# Patient Record
Sex: MALE
[De-identification: names, ages, dates, MRNs, and addresses within clinical notes are randomized per-mention and may not be internally consistent; named-entity substitution may affect disease eponyms.]

---

## 2020-01-27 ENCOUNTER — HOSPITAL ENCOUNTER (EMERGENCY)
Dept: HOSPITAL 56 - MW.ED | Age: 35
Discharge: HOME | End: 2020-01-27
Payer: SELF-PAY

## 2020-01-27 VITALS — HEART RATE: 58 BPM | DIASTOLIC BLOOD PRESSURE: 81 MMHG | SYSTOLIC BLOOD PRESSURE: 133 MMHG

## 2020-01-27 DIAGNOSIS — R25.9: Primary | ICD-10-CM

## 2020-01-27 LAB
BUN SERPL-MCNC: 18 MG/DL (ref 7–18)
CHLORIDE SERPL-SCNC: 102 MMOL/L (ref 98–107)
CO2 SERPL-SCNC: 23.2 MMOL/L (ref 21–32)
GLUCOSE SERPL-MCNC: 113 MG/DL (ref 74–106)
LIPASE SERPL-CCNC: 111 U/L (ref 73–393)
POTASSIUM SERPL-SCNC: 4.1 MMOL/L (ref 3.5–5.1)
SODIUM SERPL-SCNC: 138 MMOL/L (ref 136–148)

## 2020-01-27 PROCEDURE — 83735 ASSAY OF MAGNESIUM: CPT

## 2020-01-27 PROCEDURE — 36415 COLL VENOUS BLD VENIPUNCTURE: CPT

## 2020-01-27 PROCEDURE — 99285 EMERGENCY DEPT VISIT HI MDM: CPT

## 2020-01-27 PROCEDURE — 84484 ASSAY OF TROPONIN QUANT: CPT

## 2020-01-27 PROCEDURE — 96374 THER/PROPH/DIAG INJ IV PUSH: CPT

## 2020-01-27 PROCEDURE — 84146 ASSAY OF PROLACTIN: CPT

## 2020-01-27 PROCEDURE — 80053 COMPREHEN METABOLIC PANEL: CPT

## 2020-01-27 PROCEDURE — 70450 CT HEAD/BRAIN W/O DYE: CPT

## 2020-01-27 PROCEDURE — 96361 HYDRATE IV INFUSION ADD-ON: CPT

## 2020-01-27 PROCEDURE — 71045 X-RAY EXAM CHEST 1 VIEW: CPT

## 2020-01-27 PROCEDURE — 81003 URINALYSIS AUTO W/O SCOPE: CPT

## 2020-01-27 PROCEDURE — 93005 ELECTROCARDIOGRAM TRACING: CPT

## 2020-01-27 PROCEDURE — 85025 COMPLETE CBC W/AUTO DIFF WBC: CPT

## 2020-01-27 PROCEDURE — 83690 ASSAY OF LIPASE: CPT

## 2020-01-27 NOTE — EDM.PDOC
ED HPI GENERAL MEDICAL PROBLEM





- General


Chief Complaint: Neuro Symptoms/Deficits


Stated Complaint: seizure


Time Seen by Provider: 01/27/20 09:59


Source of Information: Reports: Patient, EMS


History Limitations: Reports: No Limitations





- History of Present Illness


INITIAL COMMENTS - FREE TEXT/NARRATIVE: 


HISTORY AND PHYSICAL:





History of present illness:


Patient is a 34-year-old male who presents to the ED today via EMS with concern 

of seizure-like activity that occurred just prior to arrival to the ED. Per EMS 

report, patient was at work and was in a bobcat when the coworkers had noticed 

that the bobcat was shaking.  Coworkers went to go check on him and saw that he 

was having shaking-like activity and seizure-like activity.  Per coworkers this 

lasted 2 to 3 minutes and patient was incontinent of urine on scene.  Upon 

arrival to the ED, patient is alert and states that he does not remember what 

had happened.  Patient states he has never had seizure activity before.





Patient denies fever, chills, chest pain, shortness of breath, or cough. Denies 

headache, neck stiff ness, change in vision, syncope, or near syncope. Denies 

nausea, vomiting, abdominal pain, diarrhea, constipation, or dysuria. Has not 

noted any blood in urine or stool. Patient has been eating and drinking 

appropriately.





Review of systems: 


As per history of present illness and below otherwise all systems reviewed and 

negative.





Past medical history: 


As per history of present illness and as reviewed below otherwise 

noncontributory.





Surgical history: 


As per history of present illness and as reviewed below otherwise 

noncontributory.





Social history: 


See social history for further information





Family history: 


As per history of present illness and as reviewed below otherwise 

noncontributory.





Physical exam:


General: Patient is alert, oriented, and in no acute distress. Patient laying 

comfortably on exam table.


HEENT: Atraumatic, normocephalic, pupils equal and reactive bilaterally, 

negative for conjunctival pallor or scleral icterus, mucous membranes moist, 

TMs normal bilaterally, throat clear, neck supple, nontender, trachea midline. 

No drooling or trismus noted. No meningeal signs. No hot potato voice noted. 

Tongue intact without bite marks.


Lungs: Clear to auscultation, breath sounds equal bilaterally, chest nontender.


Heart: S1S2, regular rate and rhythm without overt murmur


Abdomen: Soft, nondistended, nontender. Negative for masses or 

hepatosplenomegaly. Negative for costovertebral tenderness.


Pelvis: Stable nontender.


Genitourinary: Deferred.


Rectal: Deferred.


Skin: Intact, warm, dry. No lesions or rashes noted.


Extremities: Atraumatic, negative for cords or calf pain. Neurovascular 

unremarkable.


Neuro: Awake, alert, oriented. Cranial nerves II through XII unremarkable. 

Cerebellum unremarkable. Motor and sensory unremarkable throughout. Exam 

nonfocal.





Notes:


Dr. Chance verbally involved in patient care.


Discussed importance for follow-up with both a primary care provider as well as 

neurology.  Patient has been referred to the neurology clinic.


Voices understanding and is agreeable to plan of care. Denies any further 

questions or concerns at this time.





Diagnostics:


Head CT, CBC, CMP, UA, EKG, chest x-ray, prolactin, Trop, lipase, Mg





Therapeutics:


NS, Ativan 2mg





Prescription:


None





Impression: 


Seizure like activity





Plan:


1. No driving or operating heavy machinery until follow up with primary care 

provider or neurology.


2.  Follow-up with both your primary care provider and neurologist as 

discussed.  The number has been provided above for you to call and establish an 

appointment time.


3.  You can alternate ibuprofen and Tylenol as directed for pain and discomfort.


4.  Return to the ED as needed and as discussed.





Definitive disposition and diagnosis as appropriate pending reevaluation and 

review of above.








- Related Data


 Allergies











Allergy/AdvReac Type Severity Reaction Status Date / Time


 


No Known Allergies Allergy   Verified 01/27/20 09:59











Home Meds: 


 Home Meds





. [No Known Home Meds]  09/17/15 [History]











Past Medical History


Other Gastrointestinal History: Constipation, recent Flu Symptoms


Other Genitourinary History: Pt states he has only 1 kidney





- Past Surgical History


Other Female  Surgeries/Procedures: Right Nephrectomy for "Kidney Failure"





Social & Family History





- Family History


Family Medical History: Noncontributory





- Tobacco Use


Smoking Status *Q: Unknown Ever Smoked





- Recreational Drug Use


Recreational Drug Use: No





ED ROS GENERAL





- Review of Systems


Review Of Systems: Comprehensive ROS is negative, except as noted in HPI.





ED EXAM, GENERAL





- Physical Exam


Exam: See Below (see dictation)





Course





- Vital Signs


Last Recorded V/S: 


 Last Vital Signs











Temp  97.5 F   01/27/20 09:59


 


Pulse  100   01/27/20 09:59


 


Resp  16   01/27/20 09:59


 


BP  135/87   01/27/20 09:59


 


Pulse Ox  92 L  01/27/20 09:59














- Orders/Labs/Meds


Orders: 


 Active Orders 24 hr











 Category Date Time Status


 


 EKG Documentation Completion [RC] STAT Care  01/27/20 10:02 Active


 


 UA RFX JOHNIE AND CULT IF INDIC [URIN] Stat Lab  01/27/20 10:01 Ordered











Labs: 


 Laboratory Tests











  01/27/20 01/27/20 01/27/20 Range/Units





  10:10 10:10 10:10 


 


WBC  5.13    (4.0-11.0)  K/uL


 


RBC  4.49 L    (4.50-5.90)  M/uL


 


Hgb  13.7    (13.0-17.0)  g/dL


 


Hct  41.5    (38.0-50.0)  %


 


MCV  92.4    (80.0-98.0)  fL


 


MCH  30.5    (27.0-32.0)  pg


 


MCHC  33.0    (31.0-37.0)  g/dL


 


RDW Std Deviation  40.6    (28.0-62.0)  fl


 


RDW Coeff of Moni  12    (11.0-15.0)  %


 


Plt Count  175    (150-400)  K/uL


 


MPV  11.70    (7.40-12.00)  fL


 


Neut % (Auto)  46.9 L    (48.0-80.0)  %


 


Lymph % (Auto)  38.0    (16.0-40.0)  %


 


Mono % (Auto)  9.4    (0.0-15.0)  %


 


Eos % (Auto)  5.5    (0.0-7.0)  %


 


Baso % (Auto)  0.2    (0.0-1.5)  %


 


Neut # (Auto)  2.4    (1.4-5.7)  K/uL


 


Lymph # (Auto)  2.0    (0.6-2.4)  K/uL


 


Mono # (Auto)  0.5    (0.0-0.8)  K/uL


 


Eos # (Auto)  0.3    (0.0-0.7)  K/uL


 


Baso # (Auto)  0.0    (0.0-0.1)  K/uL


 


Nucleated RBC %  0.0    /100WBC


 


Nucleated RBCs #  0    K/uL


 


Sodium   138   (136-148)  mmol/L


 


Potassium   4.1   (3.5-5.1)  mmol/L


 


Chloride   102   ()  mmol/L


 


Carbon Dioxide   23.2   (21.0-32.0)  mmol/L


 


BUN   18   (7.0-18.0)  mg/dL


 


Creatinine   1.4 H   (0.8-1.3)  mg/dL


 


Est Cr Clr Drug Dosing   81.85   mL/min


 


Estimated GFR (MDRD)   > 60.0   ml/min


 


Glucose   113 H   ()  mg/dL


 


Calcium   8.7   (8.5-10.1)  mg/dL


 


Magnesium    2.0  (1.8-2.4)  mg/dL


 


Total Bilirubin   0.6   (0.2-1.0)  mg/dL


 


AST   21   (15-37)  IU/L


 


ALT   37   (14-63)  IU/L


 


Alkaline Phosphatase   70   ()  U/L


 


Troponin I   < 0.050   (0.000-0.056)  ng/mL


 


Total Protein   7.5   (6.4-8.2)  g/dL


 


Albumin   3.8   (3.4-5.0)  g/dL


 


Globulin   3.7   (2.6-4.0)  g/dL


 


Albumin/Globulin Ratio   1.0   (0.9-1.6)  


 


Lipase   111   ()  U/L


 


Prolactin   22.0   ng/mL











Meds: 


Medications














Discontinued Medications














Generic Name Dose Route Start Last Admin





  Trade Name Freq  PRN Reason Stop Dose Admin


 


Sodium Chloride  1,000 mls @ 999 mls/hr  01/27/20 10:01  01/27/20 10:28





  Normal Saline  IV  01/27/20 11:01  999 mls/hr





  BOLUS ONE   Administration





     





     





     





     


 


Lorazepam  2 mg  01/27/20 10:02  01/27/20 10:29





  Ativan  IVPUSH  01/27/20 10:03  2 mg





  ONETIME ONE   Administration





     





     





     





     














Departure





- Departure


Time of Disposition: 12:15


Disposition: Home, Self-Care 01


Clinical Impression: 


 Seizure-like activity








- Discharge Information


Referrals: 


PCP,None [Primary Care Provider] - 


Forms:  ED Department Discharge


Additional Instructions: 


The following information is given to patients seen in the emergency department 

who are being discharged to home. This information is to outline your options 

for follow-up care. We provide all patients seen in our emergency department 

with a follow-up referral.





The need for follow-up, as well as the timing and circumstances, are variable 

depending upon the specifics of your emergency department visit.





If you don't have a primary care physician on staff, we will provide you with a 

referral. We always advise you to contact your personal physician following an 

emergency department visit to inform them of the circumstance of the visit and 

for follow-up with them and/or the need for any referrals to a consulting 

specialist.





The emergency department will also refer you to a specialist when appropriate. 

This referral assures that you have the opportunity for follow-up care with a 

specialist. All of these measure are taken in an effort to provide you with 

optimal care, which includes your follow-up.





Under all circumstances we always encourage you to contact your private 

physician who remains a resource for coordinating your care. When calling for 

follow-up care, please make the office aware that this follow-up is from your 

recent emergency room visit. If for any reason you are refused follow-up, 

please contact the [facility] Emergency 

Department at [phone] and asked to speak to the emergency department 

charge nurse.





[facility]


Primary Care


[address] [place], [place] [address]


Phone: [phone]


Fax: [phone]





[address] [address]


Phone: [phone]


Fax: [phone]





[facility] - Neurology


[address]


[address] [place] [address], [address] 


[place] [address]


Phone: [phone]


Fax: [phone]








1. No driving or operating heavy machinery until follow up with primary care 

provider or neurology.


2.  Follow-up with both your primary care provider and neurologist as 

discussed.  The number has been provided above for you to call and establish an 

appointment time.


3.  You can alternate ibuprofen and Tylenol as directed for pain and discomfort.


4.  Return to the ED as needed and as discussed.











 











Sepsis Event Note





- Evaluation


Sepsis Screening Result: No Definite Risk





- Focused Exam


Vital Signs: 


 Vital Signs











  Temp Pulse Resp BP Pulse Ox


 


 01/27/20 09:59  97.5 F  100  16  135/87  92 L











Date Exam was Performed: 01/27/20


Time Exam was Performed: 12:13





- My Orders


Last 24 Hours: 


My Active Orders





01/27/20 10:01


UA RFX JOHNIE AND CULT IF INDIC [URIN] Stat 





01/27/20 10:02


EKG Documentation Completion [RC] STAT 














- Assessment/Plan


Last 24 Hours: 


My Active Orders





01/27/20 10:01


UA RFX JOHNIE AND CULT IF INDIC [URIN] Stat 





01/27/20 10:02


EKG Documentation Completion [RC] STAT

## 2020-01-27 NOTE — CR
Chest: AP view of the chest was obtained.

 

Comparison: No prior chest x-ray.

 

Heart size and mediastinum are normal.  Lungs are clear.  Bony 

structures are unremarkable.

 

Impression:

1.  Nothing acute is appreciated on AP view of the chest x-ray.

 

Diagnostic code #1

 

This report was dictated in [place] Standard Time

## 2020-01-27 NOTE — CT
Head CT

 

Technique: Multiple axial sections through the brain were obtained.  

Intravenous contrast was not utilized.

 

Comparison: Prior head CT study of 11/25/12 is available.

 

Findings: Ventricles are dilated.  These findings are stable from 

previous head CT exam.  No worsening is appreciated.  Mild 

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  This also appears 

stable.

 

No abnormal parenchymal densities are seen.  No evidence of 

intracranial hemorrhage.  No midline shift or mass effect is seen.

 

Bone window settings were reviewed which shows no acute calvarial 

abnormality.  Mastoid sinuses show nothing acute.  Visualized 

paranasal sinuses also show nothing acute.

 

Impression:

1.  Stable findings as described above.

2.  Nothing acute is appreciated on noncontrast head CT exam.

 

Diagnostic code #2

 

This report was dictated in [place] Standard Time

## 2020-09-28 NOTE — EDM.PDOC
ED HPI GENERAL MEDICAL PROBLEM





- General


Chief Complaint: Neuro Symptoms/Deficits


Stated Complaint: seizure


Time Seen by Provider: 09/28/20 12:06


Source of Information: Reports: Patient


History Limitations: Reports: No Limitations





- History of Present Illness


INITIAL COMMENTS - FREE TEXT/NARRATIVE: 





35-year-old male with history of seizure disorder presents with seizure episode 

today at work.  He is supposedly compliant with his antiepileptics.  Patient 

denies fever, chills, headache, chest pain, shortness of breath, abdominal pain,

focal numbness or weakness.





ROS: A 10-point review of systems, other than pertinent positives and negatives 

as stated per HPI, is otherwise negative





Past medical history: No additional pertinent history


Past Surgical history: No additional pertinent history


Social history: No additional pertinent history


Family history: No additional pertinent history


________________________________________________________________________________





PHYSICAL EXAM





General: AOx4, GCS = 15, No distress


HEENT: dry mucous membrane


Neck: supple, no meningismus, no Kernig or Brudzinski


Cardiac: S1S2 RRR


Respiratory: CTAB, no crackles or rales, no wheezing


Abdomen: Soft, nontender, no rebound or guarding, nondistended, no pulsatile 

mass.


Back: nontender


Musculoskeletal: NVI distally, no deformity


Neuro: No focal deficits











- Related Data


                                    Allergies











Allergy/AdvReac Type Severity Reaction Status Date / Time


 


No Known Allergies Allergy   Verified 09/28/20 11:56











Home Meds: 


                                    Home Meds





. [No Known Home Meds]  09/17/15 [History]











Past Medical History


Other Gastrointestinal History: Constipation, recent Flu Symptoms


Other Genitourinary History: Pt states he has only 1 kidney


Neurological History: Reports: Seizure





- Past Surgical History


Male  Surgical History: Reports: Nephrectomy





Social & Family History





- Family History


Family Medical History: Noncontributory





- Tobacco Use


Smoking Status *Q: Never Smoker





- Recreational Drug Use


Recreational Drug Use: No





ED ROS GENERAL





- Review of Systems


Review Of Systems: See Below (see dictation)





- Physical Exam


Exam: See Below





Course





- Vital Signs


Last Recorded V/S: 


                                Last Vital Signs











Temp  96.7 F L  09/28/20 11:52


 


Pulse  89   09/28/20 13:28


 


Resp  16   09/28/20 13:28


 


BP  112/48 L  09/28/20 13:28


 


Pulse Ox  98   09/28/20 13:28














- Orders/Labs/Meds


Orders: 


                               Active Orders 24 hr











 Category Date Time Status


 


 Cardiac Monitoring [RC] .AS DIRECTED Care  09/28/20 12:19 Active


 


 Pulse Oximetry [RC] ASDIRECTED Care  09/28/20 12:19 Active


 


 DRUG SCREEN, URINE [URCHEM] Stat Lab  09/28/20 12:19 Ordered


 


 Sodium Chloride 0.9% [Saline Flush] Med  09/28/20 12:19 Active





 10 ml FLUSH ASDIRECTED PRN   


 


 Sodium Chloride 0.9% [Saline Flush] Med  09/28/20 12:19 Active





 2.5 ml FLUSH ASDIRECTED PRN   


 


 Saline Lock Insert [OM.PC] Stat Oth  09/28/20 12:19 Ordered








                                Medication Orders





Sodium Chloride (Saline Flush)  10 ml FLUSH ASDIRECTED PRN


   PRN Reason: Keep Vein Open


   Last Admin: 09/28/20 12:49  Dose: 10 ml


   Documented by: [id number]


Sodium Chloride (Saline Flush)  2.5 ml FLUSH ASDIRECTED PRN


   PRN Reason: Keep Vein Open


   Last Admin: 09/28/20 12:50  Dose: 2.5 ml


   Documented by: [id number]








Labs: 


                                Laboratory Tests











  09/28/20 09/28/20 09/28/20 Range/Units





  12:40 12:40 12:40 


 


WBC  8.60    (4.0-11.0)  K/uL


 


RBC  4.51    (4.50-5.90)  M/uL


 


Hgb  13.5    (13.0-17.0)  g/dL


 


Hct  42.0    (38.0-50.0)  %


 


MCV  93.1    (80.0-98.0)  fL


 


MCH  29.9    (27.0-32.0)  pg


 


MCHC  32.1    (31.0-37.0)  g/dL


 


RDW Std Deviation  39.5    (28.0-62.0)  fl


 


RDW Coeff of Moni  12    (11.0-15.0)  %


 


Plt Count  200    (150-400)  K/uL


 


MPV  11.40    (7.40-12.00)  fL


 


Neut % (Auto)  45.5 L    (48.0-80.0)  %


 


Lymph % (Auto)  40.0    (16.0-40.0)  %


 


Mono % (Auto)  10.0    (0.0-15.0)  %


 


Eos % (Auto)  4.3    (0.0-7.0)  %


 


Baso % (Auto)  0.2    (0.0-1.5)  %


 


Neut # (Auto)  3.9    (1.4-5.7)  K/uL


 


Lymph # (Auto)  3.4 H    (0.6-2.4)  K/uL


 


Mono # (Auto)  0.9 H    (0.0-0.8)  K/uL


 


Eos # (Auto)  0.4    (0.0-0.7)  K/uL


 


Baso # (Auto)  0.0    (0.0-0.1)  K/uL


 


Nucleated RBC %  0.0    /100WBC


 


Nucleated RBCs #  0    K/uL


 


INR   1.09   


 


Sodium    138  (136-148)  mmol/L


 


Potassium    3.0 L  (3.5-5.1)  mmol/L


 


Chloride    99  ()  mmol/L


 


Carbon Dioxide    20.0 L  (21.0-32.0)  mmol/L


 


BUN    11  (7.0-18.0)  mg/dL


 


Creatinine    1.4 H  (0.8-1.3)  mg/dL


 


Est Cr Clr Drug Dosing    81.60  mL/min


 


Estimated GFR (MDRD)    58.0  ml/min


 


Glucose    110 H  ()  mg/dL


 


Calcium    9.1  (8.5-10.1)  mg/dL


 


Phosphorus    3.5  (2.6-4.7)  mg/dL


 


Magnesium    1.8  (1.8-2.4)  mg/dL


 


Total Bilirubin    0.5  (0.2-1.0)  mg/dL


 


AST    25  (15-37)  IU/L


 


ALT    32  (14-63)  IU/L


 


Alkaline Phosphatase    81  ()  U/L


 


C-Reactive Protein    <0.20  (0.00-0.90)  mg/dL


 


Total Protein    8.0  (6.4-8.2)  g/dL


 


Albumin    4.2  (3.4-5.0)  g/dL


 


Globulin    3.8  (2.6-4.0)  g/dL


 


Albumin/Globulin Ratio    1.1  (0.9-1.6)  


 


Ethyl Alcohol    < 3.0  mg/dL











Meds: 


Medications











Generic Name Dose Route Start Last Admin





  Trade Name Freq  PRN Reason Stop Dose Admin


 


Sodium Chloride  10 ml  09/28/20 12:19  09/28/20 12:49





  Saline Flush  FLUSH   10 ml





  ASDIRECTED PRN   Administration





  Keep Vein Open  


 


Sodium Chloride  2.5 ml  09/28/20 12:19  09/28/20 12:50





  Saline Flush  FLUSH   2.5 ml





  ASDIRECTED PRN   Administration





  Keep Vein Open  














Discontinued Medications














Generic Name Dose Route Start Last Admin





  Trade Name Freq  PRN Reason Stop Dose Admin


 


Levetiracetam 3,000 mg/  130 mls @ 460 mls/hr  09/28/20 12:39  09/28/20 13:28





  Dextrose/Water  IV  09/28/20 12:55  460 mls/hr





  Q12H STA   Administration


 


Lorazepam  Confirm  09/28/20 12:35  09/28/20 12:49





  Ativan  Administered  09/28/20 12:36  Not Given





  Dose  





  2 mg  





  .ROUTE  





  .STK-MED ONE  


 


Lorazepam  2 mg  09/28/20 12:46  09/28/20 12:37





  Ativan  IVPUSH  09/28/20 12:47  2 mg





  ONETIME ONE   Administration


 


Potassium Chloride  40 meq  09/28/20 14:00  09/28/20 14:44





  Klor-Con M20  PO  09/28/20 14:01  40 meq





  ONETIME ONE   Administration














- Re-Assessments/Exams


Free Text/Narrative Re-Assessment/Exam: 





09/28/20 12:42


Patient is actively seizing, abated with 2 mg IV Ativan.  Will start IV Keppra 3

 gm





09/28/20 14:57


Patient is alert and oriented now, no longer postictal.





09/28/20 15:20


After IV Keppra and prolonged observation in the ER, the patient improved and is

 currently stable for discharge.  He has not had recurrent episodes of seizure. 

 I performed a repeat exam and did not appreciate new abnormal findings. Patient

 exhibits normal vital signs and has a normal gait on road test. I advised the 

patient to return to the ER for reevaluation if symptoms worsened, including 

fever, worsening pain, or any other worrisome symptoms. I instructed the patient

 to follow up with neurology within 2-3 days.  I instructed him to continue 

taking his seizure medication.





________________________________________________________________________________





MEDICAL DECISION MAKING: I reviewed the patients past medical records, lab and 

radiographic findings. I discussed the case with the patient. My differential 

diagnosis included: Electrolyte abnormality, hypoglycemia, seizure activity, 

subtherapeutic antiepileptic.  Blood work did not demonstrate hyperglycemia or 

signs of infection, I do not suspect CNS infectious etiology, including 

encephalitis or meningitis.  Patient's potassium was 3.0, repleted with p.o. 

potassium.  He was not tachycardic or tremulous, despite his alcohol level being

 undetectable, I do not suspect alcohol withdrawal seizure.  Patient is 

compliant taking his at home seizure medications, he had one episode of 

transient seizure activity abated with 2 mg IV Ativan in the ER, he was given 3 

g IV Keppra in the ER for loading.  After observation, has not had recurrent 

seizures, no focal deficit on my repeat exam, normal gait on repeat exam.  I 

suspect he is stable for outpatient follow-up with neurology for further 

medication adjustment.











Departure





- Departure


Time of Disposition: 14:58


Disposition: Home, Self-Care 01


Condition: Good


Clinical Impression: 


 Seizure








- Discharge Information


*PRESCRIPTION DRUG MONITORING PROGRAM REVIEWED*: Not Applicable


*COPY OF PRESCRIPTION DRUG MONITORING REPORT IN PATIENT ADOLFO: Not Applicable


Instructions:  Seizure, Adult, Easy-to-Read


Referrals: 


PCP,None [Primary Care Provider] - 


Forms:  ED Department Discharge


Additional Instructions: 


The need for follow-up, as well as the timing and circumstances, are variable 

depending upon the specifics of your emergency department visit.





If you don't have a primary care physician on staff, we will provide you with a 

referral. We always advise you to contact your personal physician following an 

emergency department visit to inform them of the circumstance of the visit and f

or follow-up with them and/or the need for any referrals to a consulting 

specialist.





The emergency department will also refer you to a specialist when appropriate. 

This referral assures that you have the opportunity for follow-up care with a 

specialist. All of these measure are taken in an effort to provide you with 

optimal care, which includes your follow-up.





Under all circumstances we always encourage you to contact your private 

physician who remains a resource for coordinating your care. When calling for 

follow-up care, please make the office aware that this follow-up is from your 

recent emergency room visit. If for any reason you are refused follow-up, please

contact the [facility] Emergency Department

at [phone] and asked to speak to the emergency department charge nurse.





If you do not have a primary care doctor, please follow up with the clinics 

below within 3-5 days. 








Neurology


[facility] - Neurology


[address]


[address] [place] [address], [address] 


[place] [address]


Phone: [phone]


Fax: [phone]





Sepsis Event Note (ED)





- Evaluation


Sepsis Screening Result: No Definite Risk





- Focused Exam


Vital Signs: 


                                   Vital Signs











  Temp Pulse Resp BP Pulse Ox


 


 09/28/20 13:28   89  16  112/48 L  98


 


 09/28/20 12:47   117 H  16  122/55 L  98


 


 09/28/20 11:52  96.7 F L  97  16  139/83  98














- My Orders


Last 24 Hours: 


My Active Orders





09/28/20 12:19


Cardiac Monitoring [RC] .AS DIRECTED 


Pulse Oximetry [RC] ASDIRECTED 


DRUG SCREEN, URINE [URCHEM] Stat 


Sodium Chloride 0.9% [Saline Flush]   10 ml FLUSH ASDIRECTED PRN 


Sodium Chloride 0.9% [Saline Flush]   2.5 ml FLUSH ASDIRECTED PRN 


Saline Lock Insert [OM.PC] Stat 














- Assessment/Plan


Last 24 Hours: 


My Active Orders





09/28/20 12:19


Cardiac Monitoring [RC] .AS DIRECTED 


Pulse Oximetry [RC] ASDIRECTED 


DRUG SCREEN, URINE [URCHEM] Stat 


Sodium Chloride 0.9% [Saline Flush]   10 ml FLUSH ASDIRECTED PRN 


Sodium Chloride 0.9% [Saline Flush]   2.5 ml FLUSH ASDIRECTED PRN 


Saline Lock Insert [OM.PC] Stat

## 2020-12-23 NOTE — EDM.PDOC
ED HPI GENERAL MEDICAL PROBLEM





- General


Chief Complaint: Neurological Problem


Stated Complaint: EMS ARRIVAL


Time Seen by Provider: 12/23/20 09:50


Source of Information: Reports: Patient


History Limitations: Reports: No Limitations





- History of Present Illness


INITIAL COMMENTS - FREE TEXT/NARRATIVE: 





Is a 35-year-old male with a history of seizures who presents today for seizures

about 2 hours ago.  Patient is unsure how long the seizure lasted but states he 

only had 1 today.  Patient states last time he had a seizure was about 3 months 

ago.  Patient states he ran of his medication has not had any of his pills in 

the past week.  Patient denies any head injuries or other injuries from the 

seizure.  Patient denies any fever chills nausea vomiting drug or alcohol use.





- Related Data


                                    Allergies











Allergy/AdvReac Type Severity Reaction Status Date / Time


 


No Known Allergies Allergy   Verified 09/28/20 11:56











Home Meds: 


                                    Home Meds





levETIRAcetam [Keppra] 750 mg PO BID #60 tablet 12/23/20 [Rx]











Past Medical History


Other Gastrointestinal History: Constipation, recent Flu Symptoms


Other Genitourinary History: Pt states he has only 1 kidney


Neurological History: Reports: Seizure





- Past Surgical History


Male  Surgical History: Reports: Nephrectomy





Social & Family History





- Family History


Family Medical History: No Pertinent Family History





ED ROS GENERAL





- Review of Systems


Review Of Systems: See Below


Constitutional: Reports: No Symptoms


HEENT: Reports: No Symptoms


Respiratory: Reports: No Symptoms


Cardiovascular: Reports: No Symptoms


Endocrine: Reports: No Symptoms


GI/Abdominal: Reports: No Symptoms


: Reports: No Symptoms


Musculoskeletal: Reports: No Symptoms


Skin: Reports: No Symptoms


Neurological: Reports: Seizure


Psychiatric: Reports: No Symptoms


Hematologic/Lymphatic: Reports: No Symptoms


Immunologic: Reports: No Symptoms





ED EXAM, NEURO





- Physical Exam


Exam: See Below


Exam Limited By: No Limitations


General Appearance: Alert, WD/WN, No Apparent Distress


Eye Exam: Bilateral Eye: EOMI, PERRL


Ears: Normal External Exam


Head Exam: Atraumatic


Neck: Normal Inspection, Supple, Non-Tender


Respiratory/Chest: No Respiratory Distress, Lungs Clear, Normal Breath Sounds


Cardiovascular: Normal Peripheral Pulses, Regular Rate, Rhythm


GI/Abdominal: Normal Bowel Sounds, Soft, Non-Tender


Neurological: Alert, Normal Mood/Affect, CN II-XII Intact, Normal Gait, Oriented

 x 3


Extremities: Normal Range of Motion


  ** #1 Interpretation


EKG Date: 12/23/20


Time: 09:45


Rhythm: NSR


Rate (Beats/Min): 95


ST-T: Normal





Course





- Vital Signs


Last Recorded V/S: 


                                Last Vital Signs











Temp  98.2 F   12/23/20 09:50


 


Pulse  87   12/23/20 10:30


 


Resp  16   12/23/20 10:30


 


BP  124/73   12/23/20 10:30


 


Pulse Ox  100   12/23/20 10:30














- Orders/Labs/Meds


Orders: 


                               Active Orders 24 hr











 Category Date Time Status


 


 LACTIC ACID,WHOLE BLOOD [BG] Stat Lab  12/23/20 12:01 Ordered


 


 Sodium Chloride 0.9% [Saline Flush] Med  12/23/20 09:56 Active





 10 ml FLUSH ASDIRECTED PRN   


 


 Sodium Chloride 0.9% [Saline Flush] Med  12/23/20 09:56 Active





 2.5 ml FLUSH ASDIRECTED PRN   


 


 Saline Lock Insert [OM.PC] Stat Oth  12/23/20 09:56 Ordered








                                Medication Orders





Sodium Chloride (Saline Flush)  10 ml FLUSH ASDIRECTED PRN


   PRN Reason: Keep Vein Open


   Last Admin: 12/23/20 10:11  Dose: 10 ml


   Documented by: CHARLIE


Sodium Chloride (Saline Flush)  2.5 ml FLUSH ASDIRECTED PRN


   PRN Reason: Keep Vein Open


   Last Admin: 12/23/20 10:11  Dose: 2.5 ml


   Documented by: CHARLIE








Labs: 


                                Laboratory Tests











  12/23/20 12/23/20 12/23/20 Range/Units





  09:45 09:45 09:45 


 


WBC  4.94    (4.0-11.0)  K/uL


 


RBC  4.45 L    (4.50-5.90)  M/uL


 


Hgb  13.2    (13.0-17.0)  g/dL


 


Hct  41.3    (38.0-50.0)  %


 


MCV  92.8    (80.0-98.0)  fL


 


MCH  29.7    (27.0-32.0)  pg


 


MCHC  32.0    (31.0-37.0)  g/dL


 


RDW Std Deviation  39.8    (28.0-62.0)  fl


 


RDW Coeff of Moni  12    (11.0-15.0)  %


 


Plt Count  198    (150-400)  K/uL


 


MPV  11.60    (7.40-12.00)  fL


 


Neut % (Auto)  40.6 L    (48.0-80.0)  %


 


Lymph % (Auto)  44.3 H    (16.0-40.0)  %


 


Mono % (Auto)  11.1    (0.0-15.0)  %


 


Eos % (Auto)  3.6    (0.0-7.0)  %


 


Baso % (Auto)  0.4    (0.0-1.5)  %


 


Neut # (Auto)  2.0    (1.4-5.7)  K/uL


 


Lymph # (Auto)  2.2    (0.6-2.4)  K/uL


 


Mono # (Auto)  0.6    (0.0-0.8)  K/uL


 


Eos # (Auto)  0.2    (0.0-0.7)  K/uL


 


Baso # (Auto)  0.0    (0.0-0.1)  K/uL


 


Nucleated RBC %  0.0    /100WBC


 


Nucleated RBCs #  0    K/uL


 


Lactate   4.8 H*   (0.20-2.00)  mmol/L


 


Sodium    137  (136-148)  mmol/L


 


Potassium    4.0  (3.5-5.1)  mmol/L


 


Chloride    100  ()  mmol/L


 


Carbon Dioxide    24.3  (21.0-32.0)  mmol/L


 


BUN    17  (7.0-18.0)  mg/dL


 


Creatinine    1.5 H  (0.8-1.3)  mg/dL


 


Est Cr Clr Drug Dosing    TNP  


 


Estimated GFR (MDRD)    53.6  ml/min


 


Glucose    102  ()  mg/dL


 


Calcium    8.9  (8.5-10.1)  mg/dL


 


Phosphorus    3.4  (2.6-4.7)  mg/dL


 


Magnesium    1.8  (1.8-2.4)  mg/dL


 


Total Bilirubin    0.6  (0.2-1.0)  mg/dL


 


AST    27  (15-37)  IU/L


 


ALT    36  (14-63)  IU/L


 


Alkaline Phosphatase    69  ()  U/L


 


Total Protein    7.6  (6.4-8.2)  g/dL


 


Albumin    3.8  (3.4-5.0)  g/dL


 


Globulin    3.8  (2.6-4.0)  g/dL


 


Albumin/Globulin Ratio    1.0  (0.9-1.6)  


 


Urine Color     


 


Urine Appearance     


 


Urine pH     (5.0-8.0)  


 


Ur Specific Gravity     (1.001-1.035)  


 


Urine Protein     (NEGATIVE)  mg/dL


 


Urine Glucose (UA)     (NEGATIVE)  mg/dL


 


Urine Ketones     (NEGATIVE)  mg/dL


 


Urine Occult Blood     (NEGATIVE)  


 


Urine Nitrite     (NEGATIVE)  


 


Urine Bilirubin     (NEGATIVE)  


 


Urine Urobilinogen     (<2.0)  EU/dL


 


Ur Leukocyte Esterase     (NEGATIVE)  


 


Urine Opiates Screen     (NEGATIVE)  


 


Ur Oxycodone Screen     (NEGATIVE)  


 


Urine Methadone Screen     (NEGATIVE)  


 


Ur Barbiturates Screen     (NEGATIVE)  


 


Ur Phencyclidine Scrn     (NEGATIVE)  


 


Ur Amphetamine Screen     (NEGATIVE)  


 


U Methamphetamines Scrn     (NEGATIVE)  


 


U Benzodiazepines Scrn     (NEGATIVE)  


 


U Cocaine Metab Screen     (NEGATIVE)  


 


U Marijuana (THC) Screen     (NEGATIVE)  


 


Ethyl Alcohol    < 3.0  mg/dL














  12/23/20 12/23/20 Range/Units





  10:04 10:04 


 


WBC    (4.0-11.0)  K/uL


 


RBC    (4.50-5.90)  M/uL


 


Hgb    (13.0-17.0)  g/dL


 


Hct    (38.0-50.0)  %


 


MCV    (80.0-98.0)  fL


 


MCH    (27.0-32.0)  pg


 


MCHC    (31.0-37.0)  g/dL


 


RDW Std Deviation    (28.0-62.0)  fl


 


RDW Coeff of Moni    (11.0-15.0)  %


 


Plt Count    (150-400)  K/uL


 


MPV    (7.40-12.00)  fL


 


Neut % (Auto)    (48.0-80.0)  %


 


Lymph % (Auto)    (16.0-40.0)  %


 


Mono % (Auto)    (0.0-15.0)  %


 


Eos % (Auto)    (0.0-7.0)  %


 


Baso % (Auto)    (0.0-1.5)  %


 


Neut # (Auto)    (1.4-5.7)  K/uL


 


Lymph # (Auto)    (0.6-2.4)  K/uL


 


Mono # (Auto)    (0.0-0.8)  K/uL


 


Eos # (Auto)    (0.0-0.7)  K/uL


 


Baso # (Auto)    (0.0-0.1)  K/uL


 


Nucleated RBC %    /100WBC


 


Nucleated RBCs #    K/uL


 


Lactate    (0.20-2.00)  mmol/L


 


Sodium    (136-148)  mmol/L


 


Potassium    (3.5-5.1)  mmol/L


 


Chloride    ()  mmol/L


 


Carbon Dioxide    (21.0-32.0)  mmol/L


 


BUN    (7.0-18.0)  mg/dL


 


Creatinine    (0.8-1.3)  mg/dL


 


Est Cr Clr Drug Dosing    


 


Estimated GFR (MDRD)    ml/min


 


Glucose    ()  mg/dL


 


Calcium    (8.5-10.1)  mg/dL


 


Phosphorus    (2.6-4.7)  mg/dL


 


Magnesium    (1.8-2.4)  mg/dL


 


Total Bilirubin    (0.2-1.0)  mg/dL


 


AST    (15-37)  IU/L


 


ALT    (14-63)  IU/L


 


Alkaline Phosphatase    ()  U/L


 


Total Protein    (6.4-8.2)  g/dL


 


Albumin    (3.4-5.0)  g/dL


 


Globulin    (2.6-4.0)  g/dL


 


Albumin/Globulin Ratio    (0.9-1.6)  


 


Urine Color   YELLOW  


 


Urine Appearance   CLEAR  


 


Urine pH   6.0  (5.0-8.0)  


 


Ur Specific Gravity   1.010  (1.001-1.035)  


 


Urine Protein   NEGATIVE  (NEGATIVE)  mg/dL


 


Urine Glucose (UA)   NEGATIVE  (NEGATIVE)  mg/dL


 


Urine Ketones   NEGATIVE  (NEGATIVE)  mg/dL


 


Urine Occult Blood   NEGATIVE  (NEGATIVE)  


 


Urine Nitrite   NEGATIVE  (NEGATIVE)  


 


Urine Bilirubin   NEGATIVE  (NEGATIVE)  


 


Urine Urobilinogen   0.2  (<2.0)  EU/dL


 


Ur Leukocyte Esterase   NEGATIVE  (NEGATIVE)  


 


Urine Opiates Screen  NEGATIVE   (NEGATIVE)  


 


Ur Oxycodone Screen  NEGATIVE   (NEGATIVE)  


 


Urine Methadone Screen  NEGATIVE   (NEGATIVE)  


 


Ur Barbiturates Screen  NEGATIVE   (NEGATIVE)  


 


Ur Phencyclidine Scrn  NEGATIVE   (NEGATIVE)  


 


Ur Amphetamine Screen  NEGATIVE   (NEGATIVE)  


 


U Methamphetamines Scrn  NEGATIVE   (NEGATIVE)  


 


U Benzodiazepines Scrn  NEGATIVE   (NEGATIVE)  


 


U Cocaine Metab Screen  NEGATIVE   (NEGATIVE)  


 


U Marijuana (THC) Screen  NEGATIVE   (NEGATIVE)  


 


Ethyl Alcohol    mg/dL











Meds: 


Medications











Generic Name Dose Route Start Last Admin





  Trade Name Freq  PRN Reason Stop Dose Admin


 


Sodium Chloride  10 ml  12/23/20 09:56  12/23/20 10:11





  Saline Flush  FLUSH   10 ml





  ASDIRECTED PRN   Administration





  Keep Vein Open  


 


Sodium Chloride  2.5 ml  12/23/20 09:56  12/23/20 10:11





  Saline Flush  FLUSH   2.5 ml





  ASDIRECTED PRN   Administration





  Keep Vein Open  














Discontinued Medications














Generic Name Dose Route Start Last Admin





  Trade Name Jamie  PRN Reason Stop Dose Admin


 


Acetaminophen  1,000 mg  12/23/20 10:55  12/23/20 11:00





  Tylenol Extra Strength  PO  12/23/20 10:56  1,000 mg





  ONETIME ONE   Administration


 


Sodium Chloride  1,000 mls @ 999 mls/hr  12/23/20 09:56  12/23/20 10:11





  Normal Saline  IV  12/23/20 10:56  999 mls/hr





  .BOLUS ONE   Administration


 


Levetiracetam  1,500 mg  12/23/20 11:15  12/23/20 11:35





  Keppra  PO  12/23/20 11:16  1,500 mg





  NOW ONE   Administration














- Re-Assessments/Exams


Free Text/Narrative Re-Assessment/Exam: 





12/23/20 12:10


Patient medication list was faxed over from pharmacy shows that he takes Keppra 

750 mg twice daily.  It also showed the patient picked up the prescription on 

December 15 have patient states not sure he does not have any medications at 

home.  We will still prescribe patient a months worth of his Keppra and he can 

see his neurologist.  Patient breakthrough seizure likely due to noncompliance 

of medication.  Patient did have elevated lactate likely due to the seizure 

earlier was given a liter of fluids recheck.  Patient stable for discharge home.





Departure





- Departure


Time of Disposition: 12:10


Disposition: Home, Self-Care 01


Condition: Good


Clinical Impression: 


 Breakthrough seizure








- Discharge Information


*PRESCRIPTION DRUG MONITORING PROGRAM REVIEWED*: Not Applicable


*COPY OF PRESCRIPTION DRUG MONITORING REPORT IN PATIENT ADOLFO: Not Applicable


Prescriptions: 


levETIRAcetam [Keppra] 750 mg PO BID #60 tablet


Referrals: 


PCP,None [Primary Care Provider] - 


Forms:  ED Department Discharge


Additional Instructions: 


The following information is given to patients seen in the emergency department 

who are being discharged to home. This information is to outline your options 

for follow-up care. We provide all patients seen in our emergency department 

with a follow-up referral.





The need for follow-up, as well as the timing and circumstances, are variable 

depending upon the specifics of your emergency department visit.





If you don't have a primary care physician on staff, we will provide you with a 

referral. We always advise you to contact your personal physician following an 

emergency department visit to inform them of the circumstance of the visit and 

for follow-up with them and/or the need for any referrals to a consulting 

specialist.





The emergency department will also refer you to a specialist when appropriate. 

This referral assures that you have the opportunity for follow-up care with a 

specialist. All of these measure are taken in an effort to provide you with 

optimal care, which includes your follow-up.





Under all circumstances we always encourage you to contact your private 

physician who remains a resource for coordinating your care. When calling for 

follow-up care, please make the office aware that this follow-up is from your 

recent emergency room visit. If for any reason you are refused follow-up, please

contact the [facility] Emergency Department

at [phone] and asked to speak to the emergency department charge nurse.





Please follow up with your primary care physician. If you do not have a primary 

care physician, see below:


[facility]


[address] [place] [id number] [phone]





My [facility]


[address] [place] [id number] [phone]








Please follow-up with neurologist for further care.  If you continue to have 

seizures or other symptoms please return to the emergency department.





Sepsis Event Note (ED)





- Focused Exam


Vital Signs: 


                                   Vital Signs











  Temp Pulse Resp BP Pulse Ox


 


 12/23/20 10:30   87  16  124/73  100


 


 12/23/20 10:00   88  16  116/69  98


 


 12/23/20 09:50  98.2 F  96  17  122/72  96














- My Orders


Last 24 Hours: 


My Active Orders





12/23/20 09:56


Sodium Chloride 0.9% [Saline Flush]   10 ml FLUSH ASDIRECTED PRN 


Sodium Chloride 0.9% [Saline Flush]   2.5 ml FLUSH ASDIRECTED PRN 


Saline Lock Insert [OM.PC] Stat 





12/23/20 12:01


LACTIC ACID,WHOLE BLOOD [BG] Stat 














- Assessment/Plan


Last 24 Hours: 


My Active Orders





12/23/20 09:56


Sodium Chloride 0.9% [Saline Flush]   10 ml FLUSH ASDIRECTED PRN 


Sodium Chloride 0.9% [Saline Flush]   2.5 ml FLUSH ASDIRECTED PRN 


Saline Lock Insert [OM.PC] Stat 





12/23/20 12:01


LACTIC ACID,WHOLE BLOOD [BG] Stat 











Assessment:: 





Patient is a 35-year-old male who presents today for seizure.  Patient has not 

had his medication in the past week.  Likely source of seizures noncompliant 

with medication.  Will rule any infections and reassess.  Patient is unsure of 

his medication list will call pharmacy to get complete list of meds.

## 2021-03-23 NOTE — EDM.PDOC
ED HPI GENERAL MEDICAL PROBLEM





- General


Chief Complaint: Neuro Symptoms/Deficits


Stated Complaint: SEIZURE


Time Seen by Provider: 03/23/21 21:00





- History of Present Illness


INITIAL COMMENTS - FREE TEXT/NARRATIVE: 





HISTORY AND PHYSICAL:





History of present illness:


This is a 35-year-old gentleman with a history significant for seizures in the 

past who presents ER today secondary to witnessed seizure by his wife prior to 

arrival.  Patient reports that he for started having seizures in 2013.  Patient 

reports that he was seizure-free until 2020 when he has had 3 seizures a year.  

Patient reports that he is currently being followed by a neurologist for 

seizures and is supposed to be on Keppra 500 mg twice a day.  Patient reports 

that he is not taking his medication for quite some time until Sunday, 2 days 

ago, when he took Keppra 500 mg in the morning in the evening.  Patient reports 

on Monday he did not have any more medication left but had an appointment to see

his neurologist.  Patient reports that he went to see his neurologist yesterday 

and obtain refills for his medication.  Patient went to the pharmacy and picked 

up his Keppra prescription but did not take any of his Keppra on Monday or 

today.  Patient reports that today he was feeling fine in his usual state of 

health.  Patient denies any recent fevers, shakes, chills, nausea, vomiting, 

diarrhea, dysuria, frequency, urgency, chest pain, shortness of breath.  Patient

denies any aura or preictal symptoms.  Patient denies any loss of bowel or 

bladder function.  Patient currently complains of a headache but denies any 

sensation of a head injury or trauma or swelling to his scalp.  Patient denies 

any other pain or discomfort to his upper or lower extremities, chest, torso, 

back, head, neck.  Patient denies any tobacco, alcohol, drugs.  Patient reports 

that he has had a nephrectomy in the past but is unclear why he had it.  Patient

denies any history of cancer.  In reviewing his old records, the patient appears

to have an MRI back in 2020 which was unremarkable.





Review of systems: 


As per history of present illness and below otherwise all systems reviewed and 

negative.





Past medical history: 


As per history of present illness and as reviewed below otherwise 

noncontributory.





Surgical history: 


As per history of present illness and as reviewed below otherwise 

noncontributory.





Social history: 


No reported history of drug or alcohol abuse.





Family history: 


As per history of present illness and as reviewed below otherwise 

noncontributory.





Physical exam:





This patient was seen and evaluated during the 2020 SARS-CoV-2 novel coronavirus

pandemic period.  Community viral transmission is ongoing at time of this 

encounter and the emergency department is operating under pandemic response 

procedures.





Constitutional: Patient is oriented to person, place, and time.  Appears well-

developed and well-nourished.  No distress.


 HEENT: Moist mucous membranes


 Head: Normocephalic and atraumatic


 Eyes: Right eye exhibits no discharge.  Left eye exhibits no discharge.  No 

scleral icterus


 Neck: Normal range of motion.  No tracheal deviation present.


 Cardiovascular: Normal rate and regular rhythm.


 Pulmonary: Effort normal, no respiratory distress.


 Abdominal: No distention


 Musculoskeletal: Normal range of motion


 Neurologic: Alert and oriented to person, place and time.


 Skin: Pink, warm and dry.  


 Psychiatric: Normal mood and affect.  Behavior is normal.  Judgment and thought

content normal.


 Nursing note and vital signs have been reviewed





Patient has no C-spine T-spine or L-spine tenderness to palpation.  Patient has 

no left upper or right upper quadrant tenderness to palpation.  Patient has no 

crepitus to palpation to the anterior chest wall.  Patient is neurologically 

intact.  Patient does not present with any signs or or symptoms that would be 

consistent with acute intracranial, intra-abdominal, intrathoracic, or long bone

injury.  All long bones have been palpated and range of motion been performed 

and there is no evidence of any acute pathology.


Neuro: A&Ox3. Cranial nerves II-XII grossly intact, 5/5 strength to bilateral 

upper and lower extremities, sensation intact to bilateral upper and lower 

extremities, no nystagmus, PERRLA, EOMI, normal speech, proprioception intact to

bilateral lower extremities, normal finger to nose test, gait normal











Diagnostics:


CBC, CMP





Therapeutics:


NSS x1 L, Keppra 1 g IV.  Tylenol 1 g p.o.





Assessment and plan:


This is a 35-year-old gentleman who presents ER today secondary to a seizure.  

Patient has a history of seizure disorder in the past and has been noncompliant 

with his Keppra.  Patient did see his neurologist yesterday for scheduled 

appointment and got his medications refilled.  Patient presents the ER today 

with a bottle that was filled on March 10 (although the patient reports that he 

picked it up yesterday from the pharmacy) which still has 60 out of 60 pills 

remaining.  Patient reports that the 2 pills that he took on Sunday where the 

last 2 pills from a prior prescription.  Patient is unclear why he did not take 

any of his medications yesterday or today after having seen the neurologist and 

having had his prescription picked up.





Patient will be monitored here in the ED.  We will check a CBC and a CMP.  We 

will give the patient 1 g of IV Keppra to help bolus him.  Patient be given 

Tylenol to assist with his headache.








10:11 PM: Patient was monitored in the ER and is remained stable.  Patient has 

been given 1 g of IV Keppra.  Patient's labs are all within normal limits.  

Patient is stable for discharge at this time.  Patient does have his wife at 

home who can monitor him.  Patient will be instructed as to the importance of 

taking his Keppra as instructed and to follow-up with his neurologist for 

reevaluation as needed.





Reassessment at the time of disposition demonstrates that the patient is in no 

acute distress.  The patient has remained stable throughout the entire ED visit 

and is without objective evidence for acute process requiring urgent 

intervention or hospitalization. The patient is stable for discharge, counseling

is provided as documented above, discussed symptomatic treatment and specific 

conditions for return.





I have spoken with the patient/caregiver and discussed todays findings, in 

addition to providing specific details for the plan of care. Questions are 

answered and there is agreement with the plan.


Definitive disposition and diagnosis as appropriate pending reevaluation and 

review of above.








  ** headache


Pain Score (Numeric/FACES): 8





- Related Data


                                    Allergies











Allergy/AdvReac Type Severity Reaction Status Date / Time


 


No Known Allergies Allergy   Verified 03/23/21 20:51











Home Meds: 


                                    Home Meds





Cholecalciferol (Vitamin D3) [Vitamin D3] 50,000 unit PO WEEKLY 12/23/20 

[History]


levETIRAcetam [Keppra] 750 mg PO BID #60 tablet 12/23/20 [Rx]











Past Medical History


Gastrointestinal History: Reports: Chronic Constipation


Other Gastrointestinal History: Constipation, recent Flu Symptoms


Other Genitourinary History: Pt states he has only 1 kidney (right kidney 

removed)


Neurological History: Reports: Seizure, Other (See Below)


Other Neuro History: last sx episode 2 months ago





- Infectious Disease History


Infectious Disease History: Reports: None





- Past Surgical History


GI Surgical History: Reports: None


Male  Surgical History: Reports: Nephrectomy


Neurological Surgical History: Reports: None





Social & Family History





- Family History


Family Medical History: No Pertinent Family History





- Tobacco Use


Tobacco Use Status *Q: Never Tobacco User





- Caffeine Use


Caffeine Use: Reports: None





- Recreational Drug Use


Recreational Drug Use: No





ED ROS GENERAL





- Review of Systems


Review Of Systems: See Below





ED EXAM, GENERAL





- Physical Exam


Exam: See Below





Course





- Vital Signs


Last Recorded V/S: 


                                Last Vital Signs











Temp  97.9 F   03/23/21 20:51


 


Pulse  92   03/23/21 20:51


 


Resp  20   03/23/21 20:51


 


BP  129/76   03/23/21 20:51


 


Pulse Ox  97   03/23/21 20:51














- Orders/Labs/Meds


Orders: 


                               Active Orders 24 hr











 Category Date Time Status


 


 Sodium Chloride 0.9% [Saline Flush] Med  03/23/21 21:01 Active





 10 ml FLUSH ASDIRECTED PRN   


 


 Sodium Chloride 0.9% [Saline Flush] Med  03/23/21 21:01 Active





 2.5 ml FLUSH ASDIRECTED PRN   


 


 Saline Lock Insert [OM.PC] Stat Oth  03/23/21 21:01 Ordered








                                Medication Orders





Sodium Chloride (Sodium Chloride 0.9% 10 Ml Syringe)  10 ml FLUSH ASDIRECTED PRN


   PRN Reason: Keep Vein Open


   Last Admin: 03/23/21 21:10  Dose: 10 ml


   Documented by: CHARLIE


Sodium Chloride (Sodium Chloride 0.9% 2.5 Ml Syringe)  2.5 ml FLUSH ASDIRECTED 

PRN


   PRN Reason: Keep Vein Open


   Last Admin: 03/23/21 21:10  Dose: 2.5 ml


   Documented by: CHARLIE








Labs: 


                                Laboratory Tests











  03/23/21 03/23/21 Range/Units





  20:52 20:52 


 


WBC  6.05   (4.0-11.0)  K/uL


 


RBC  4.36 L   (4.50-5.90)  M/uL


 


Hgb  13.5   (13.0-17.0)  g/dL


 


Hct  39.6   (38.0-50.0)  %


 


MCV  90.8   (80.0-98.0)  fL


 


MCH  31.0   (27.0-32.0)  pg


 


MCHC  34.1   (31.0-37.0)  g/dL


 


RDW Std Deviation  38.9   (28.0-62.0)  fl


 


RDW Coeff of Moni  12   (11.0-15.0)  %


 


Plt Count  180   (150-400)  K/uL


 


MPV  11.60   (7.40-12.00)  fL


 


Neut % (Auto)  41.9 L   (48.0-80.0)  %


 


Lymph % (Auto)  44.1 H   (16.0-40.0)  %


 


Mono % (Auto)  8.8   (0.0-15.0)  %


 


Eos % (Auto)  5.0   (0.0-7.0)  %


 


Baso % (Auto)  0.2   (0.0-1.5)  %


 


Neut # (Auto)  2.5   (1.4-5.7)  K/uL


 


Lymph # (Auto)  2.7 H   (0.6-2.4)  K/uL


 


Mono # (Auto)  0.5   (0.0-0.8)  K/uL


 


Eos # (Auto)  0.3   (0.0-0.7)  K/uL


 


Baso # (Auto)  0.0   (0.0-0.1)  K/uL


 


Nucleated RBC %  0.0   /100WBC


 


Nucleated RBCs #  0   K/uL


 


Sodium   136  (136-148)  mmol/L


 


Potassium   3.7  (3.5-5.1)  mmol/L


 


Chloride   99  ()  mmol/L


 


Carbon Dioxide   24.1  (21.0-32.0)  mmol/L


 


BUN   16  (7.0-18.0)  mg/dL


 


Creatinine   1.6 H  (0.8-1.3)  mg/dL


 


Est Cr Clr Drug Dosing   69.29  mL/min


 


Estimated GFR (MDRD)   49.7  ml/min


 


Glucose   111 H  ()  mg/dL


 


Calcium   8.7  (8.5-10.1)  mg/dL


 


Total Bilirubin   0.4  (0.2-1.0)  mg/dL


 


AST   26  (15-37)  IU/L


 


ALT   41  (14-63)  IU/L


 


Alkaline Phosphatase   71  ()  U/L


 


Total Protein   7.7  (6.4-8.2)  g/dL


 


Albumin   3.8  (3.4-5.0)  g/dL


 


Globulin   3.9  (2.6-4.0)  g/dL


 


Albumin/Globulin Ratio   1.0  (0.9-1.6)  











Meds: 


Medications











Generic Name Dose Route Start Last Admin





  Trade Name Freq  PRN Reason Stop Dose Admin


 


Sodium Chloride  10 ml  03/23/21 21:01  03/23/21 21:10





  Sodium Chloride 0.9% 10 Ml Syringe  FLUSH   10 ml





  ASDIRECTED PRN   Administration





  Keep Vein Open  


 


Sodium Chloride  2.5 ml  03/23/21 21:01  03/23/21 21:10





  Sodium Chloride 0.9% 2.5 Ml Syringe  FLUSH   2.5 ml





  ASDIRECTED PRN   Administration





  Keep Vein Open  














Discontinued Medications














Generic Name Dose Route Start Last Admin





  Trade Name Freq  PRN Reason Stop Dose Admin


 


Acetaminophen  650 mg  03/23/21 21:27 





  Acetaminophen 325 Mg Tab  PO  03/23/21 21:28 





  NOW ONE  


 


Levetiracetam 1,000 mg/  110 mls @ 440 mls/hr  03/23/21 21:23 





  Dextrose/Water  IV  03/23/21 21:37 





  Q12H STA  














Departure





- Departure


Time of Disposition: 22:12


Disposition: Home, Self-Care 01


Condition: Good


Clinical Impression: 


 Seizure








- Discharge Information


Instructions:  Seizure, Adult


Forms:  ED Department Discharge


Additional Instructions: 


Your seen and evaluated in the ER today secondary to having a seizure earlier.  

It is extremely important that you are compliant with taking her Keppra twice a 

day as instructed.  Please make an appointment to see your neurologist in the 

next 1 to 2 days for reevaluation and reassessment of your medications.  Please 

go home and get plenty of rest.  Avoid any excessive exertion, stress, computer 

use, watching TV.





The following information is given to patients seen in the emergency department 

who are being discharged to home. This information is to outline your options 

for follow-up care. We provide all patients seen in our emergency department 

with a follow-up referral.





The need for follow-up, as well as the timing and circumstances, are variable 

depending upon the specifics of your emergency department visit.





If you don't have a primary care physician on staff, we will provide you with a 

referral. We always advise you to contact your personal physician following an 

emergency department visit to inform them of the circumstance of the visit and 

for follow-up with them and/or the need for any referrals to a consulting 

specialist.





The emergency department will also refer you to a specialist when appropriate. 

This referral assures that you have the opportunity for follow-up care with a 

specialist. All of these measure are taken in an effort to provide you with 

optimal care, which includes your follow-up.





Under all circumstances we always encourage you to contact your private 

physician who remains a resource for coordinating your care. When calling for 

follow-up care, please make the office aware that this follow-up is from your 

recent emergency room visit. If for any reason you are refused follow-up, please

 contact the [facility] Emergency 

Department at [phone] and asked to speak to the emergency department 

charge nurse.





[facility]


[address] [place] [id number]


Phone: [phone]


Fax: [phone]








[facility]


[address] [place] [address]


Phone: [phone]


Fax: [phone]








Sepsis Event Note (ED)





- Evaluation


Sepsis Screening Result: No Definite Risk





- Focused Exam


Vital Signs: 


                                   Vital Signs











  Temp Pulse Resp BP Pulse Ox


 


 03/23/21 20:51  97.9 F  92  20  129/76  97














- My Orders


Last 24 Hours: 


My Active Orders





03/23/21 21:01


Sodium Chloride 0.9% [Saline Flush]   10 ml FLUSH ASDIRECTED PRN 


Sodium Chloride 0.9% [Saline Flush]   2.5 ml FLUSH ASDIRECTED PRN 


Saline Lock Insert [OM.PC] Stat 














- Assessment/Plan


Last 24 Hours: 


My Active Orders





03/23/21 21:01


Sodium Chloride 0.9% [Saline Flush]   10 ml FLUSH ASDIRECTED PRN 


Sodium Chloride 0.9% [Saline Flush]   2.5 ml FLUSH ASDIRECTED PRN 


Saline Lock Insert [OM.PC] Stat

## 2021-11-20 NOTE — EDM.PDOC
<Rosa Elena Etienned - Last Filed: 11/20/21 06:53>





ED HPI GENERAL MEDICAL PROBLEM





- General


Chief Complaint: General


Stated Complaint: SEIZURE


Time Seen by Provider: 11/20/21 06:52





- History of Present Illness


INITIAL COMMENTS - FREE TEXT/NARRATIVE: 





HISTORY AND PHYSICAL:





History of present illness:


This is a 36-year-old gentleman with a history significant for seizure disorder 

in the past, right nephrectomy in the past for on known reasons, who was brought

to the ER today by EMS secondary to a witnessed seizure while at work.  Patient 

denies any history of hypertension, diabetes, liver, lung, cardiac, strokes in 

the past.  Patient denies any recent fevers, shakes, chills, nausea, vomiting, 

diarrhea, dysuria, frequency, urgency.  Patient reports he has been tolerating 

p.o. solids and liquids well.  Patient reports that he was in his usual state of

health yesterday and was without complaints.  Patient reports that he supposed 

to be on Keppra however his last dose was approximately 2 months ago secondary 

to running out of medication.  Patient reports his last seizure was 

approximately 4 to 5 months ago.  Patient denies any current headaches, double 

vision, blurred vision, weakness of his upper or lower extremities.  Patient 

denies any tobacco, alcohol, drugs.





Review of systems: 


As per history of present illness and below otherwise all systems reviewed and 

negative.





Past medical history: 


As per history of present illness and as reviewed below otherwise 

noncontributory.





Surgical history: 


As per history of present illness and as reviewed below otherwise 

noncontributory.





Social history: 


No reported history of drug abuse.





Family history: 


As per history of present illness and as reviewed below otherwise 

noncontributory.





Physical exam:





This patient was seen and evaluated during the 2020 SARS-CoV-2 novel coronavirus

pandemic period.  Community viral transmission is ongoing at time of this 

encounter and the emergency department is operating under pandemic response 

procedures.





Constitutional: Patient is oriented to person, place, and time.  Appears well-

developed and well-nourished.  No distress.


 HEENT: Moist mucous membranes


 Head: Normocephalic and atraumatic


 Eyes: Right eye exhibits no discharge.  Left eye exhibits no discharge.  No 

scleral icterus


 Neck: Normal range of motion.  No tracheal deviation present.


 Cardiovascular: Normal rate and regular rhythm.


 Pulmonary: Effort normal, no respiratory distress.


 Abdominal: No distention


 Musculoskeletal: Normal range of motion


 Neurologic: Patient is currently alert awake and oriented x3.  Patient is 

sleepy but easily arousable.  Patient does appear to be still slightly postictal

but is able to answer questions appropriately.


 Skin: Pink, warm and dry.  


 Psychiatric: Normal mood and affect.  Behavior is normal.  Judgment and thought

content normal.


 Nursing note and vital signs have been reviewed











Diagnostics:


CBC, CMP, alcohol level.





Therapeutics:


Keppra 1 g IV





Assessment and plan:


36-year-old gentleman with a history significant for seizure disorder who 

presents to the ER today with a witnessed seizure at work.  Patient is 

noncompliant with his Keppra.  Patient is currently sleepy but easily arousable,

and oriented x3.  Patient will have basic labs drawn in the ED including a CBC, 

CMP, alcohol level.  Patient will be given a bolus dose of Keppra 1 g IV while 

in the ED.  Patient will likely need a prescription for Keppra if he is 

discharged home to assist him until he is able to see his family doctor for 

refills on his medication.





Definitive disposition and diagnosis as appropriate pending reevaluation and 

review of above.








  ** Right Shoulder


Pain Score (Numeric/FACES): 8





- Related Data


                                    Allergies











Allergy/AdvReac Type Severity Reaction Status Date / Time


 


No Known Allergies Allergy   Verified 11/20/21 06:40











Home Meds: 


                                    Home Meds





Cholecalciferol (Vitamin D3) [Vitamin D3] 50,000 unit PO WEEKLY 12/23/20 

[History]


levETIRAcetam [Keppra] 750 mg PO BID #60 tablet 12/23/20 [Rx]


levETIRAcetam [Keppra] 750 mg PO BID #180 tab 11/20/21 [Rx]











Past Medical History


HEENT History: Reports: None


Cardiovascular History: Reports: None


Respiratory History: Reports: None


Gastrointestinal History: Reports: Chronic Constipation


Other Gastrointestinal History: Constipation, recent Flu Symptoms


Other Genitourinary History: Pt states he has only 1 kidney (right kidney 

removed)


Neurological History: Reports: Seizure, Other (See Below)


Other Neuro History: last sx episode 2 months ago





- Infectious Disease History


Infectious Disease History: Reports: None





- Past Surgical History


HEENT Surgical History: Reports: None


Cardiovascular Surgical History: Reports: None


GI Surgical History: Reports: None


Male  Surgical History: Reports: Nephrectomy


Neurological Surgical History: Reports: None





Social & Family History





- Family History


Family Medical History: No Pertinent Family History





- Tobacco Use


Tobacco Use Status *Q: Never Tobacco User





- Caffeine Use


Caffeine Use: Reports: None





- Recreational Drug Use


Recreational Drug Use: No





ED ROS GENERAL





- Review of Systems


Review Of Systems: See Below





ED EXAM, GENERAL





- Physical Exam


Exam: See Below





Departure





- Departure


Disposition: Home, Self-Care 01


Clinical Impression: 


 Seizure








- Discharge Information


Prescriptions: 


levETIRAcetam [Keppra] 750 mg PO BID #180 tab


Instructions:  Seizure, Adult, Easy-to-Read


Referrals: 


PCP,None [Primary Care Provider] - 


Forms:  ED Department Discharge


Additional Instructions: 


You were evaluated today on an emergent basis.  As discussed it is extremely 

important that you take your Keppra as prescribed.  I did provide you with a 

prescription to be used twice a day.  I recommend that you follow-up with 

neurology for reevaluation and medication management.  Please follow-up with 

neurology within 1 week.  Your prescriptions were sent to [facility] pharmacy.





[facility] - Neurology


20/20 [address] [address]


Phone: [phone]


Fax: [phone]





The patient is informed of any results of their evaluation and diagnostic workup

and all questions are answered. They are given discharge instructions and return

precautions. The patient is stable for discharge.  The patient states they 

understand and agree with the plan and that they will return if their symptoms 

get worse or if they have any new concerns.





The following information is given to patients seen in the emergency department 

who are being discharged to home. This information is to outline your options 

for follow-up care. We provide all patients seen in our emergency department 

with a follow-up referral.





The need for follow-up, as well as the timing and circumstances, are variable 

depending upon the specifics of your emergency department visit.





If you don't have a primary care physician on staff, we will provide you with a 

referral. We always advise you to contact your personal physician following an 

emergency department visit to inform them of the circumstance of the visit and 

for follow-up with them and/or the need for any referrals to a consulting 

specialist.





The emergency department will also refer you to a specialist when appropriate. 

This referral assures that you have the opportunity for follow-up care with a 

specialist. All of these measure are taken in an effort to provide you with 

optimal care, which includes your follow-up.





Under all circumstances we always encourage you to contact your private 

physician who remains a resource for coordinating your care. When calling for 

follow-up care, please make the office aware that this follow-up is from your 

recent emergency room visit. If for any reason you are refused follow-up, please

contact the [facility] Emergency Department

at [phone] and asked to speak to the emergency department charge nurse.











<Davon Mchugh - Last Filed: 11/20/21 10:20>





ED HPI GENERAL MEDICAL PROBLEM





- History of Present Illness


INITIAL COMMENTS - FREE TEXT/NARRATIVE: 


Patient was signed out to me by Dr. Etienne pending labs and reevaluation at 7 

AM. I promptly performed a detailed physical examination and my examination was 

done after ED treatments were initiated by the signout provider. Patient has 

been under the care of previous provider up until this point. 





Laboratory: CBC is unremarkable.  CMP reveals metabolic acidosis with 

bicarbonate of 20.6, elevated creatinine of 1.6 which is unchanged from prior 

and glucose of 136 otherwise unremarkable.  Some alcohol level is negative.





Patient was observed in the emergency department any further episodes of 

seizing.  At this time I did discuss that I provided him with a prescription for

his Keppra.  I encouraged the patient to continue to use his Keppra as 

prescribed I discussed that he needs to follow-up with neurology within 1 week. 

He expressed that he would like a second opinion and is going to [facility] for

this.  I did discuss that although he wants a second opinion I do recommend he 

continues to take his medications as prescribed.  He was amenable to discharge 

at this time and had no further questions





DISPOSITION: The patient was discharged home in stable condition. The patient 

will follow up with neurology in 1 week





CONDITION: Fair





PROCEDURES: None





FINAL IMPRESSION(S)/DIAGNOSES: 





1.  Acute breakthrough seizure





 





Davon Mchugh M.D.





Course





- Vital Signs


Last Recorded V/S: 


                                Last Vital Signs











Temp  36.3 C   11/20/21 06:40


 


Pulse  68   11/20/21 09:10


 


Resp  16   11/20/21 09:10


 


BP  116/81   11/20/21 09:10


 


Pulse Ox  98   11/20/21 09:10














- Orders/Labs/Meds


Orders: 


                               Active Orders 24 hr











 Category Date Time Status


 


 Saline Lock Insert [OM.PC] Stat Oth  11/20/21 06:57 Ordered











Labs: 


                                Laboratory Tests











  11/20/21 11/20/21 Range/Units





  06:40 06:40 


 


WBC  6.55   (4.0-11.0)  K/uL


 


RBC  4.28 L   (4.50-5.90)  M/uL


 


Hgb  13.1   (13.0-17.0)  g/dL


 


Hct  38.5   (38.0-50.0)  %


 


MCV  90.0   (80.0-98.0)  fL


 


MCH  30.6   (27.0-32.0)  pg


 


MCHC  34.0   (31.0-37.0)  g/dL


 


RDW Std Deviation  39.4   (28.0-62.0)  fl


 


RDW Coeff of Moni  12   (11.0-15.0)  %


 


Plt Count  205   (150-400)  K/uL


 


MPV  11.40   (7.40-12.00)  fL


 


Neut % (Auto)  49.6   (48.0-80.0)  %


 


Lymph % (Auto)  36.3   (16.0-40.0)  %


 


Mono % (Auto)  9.6   (0.0-15.0)  %


 


Eos % (Auto)  4.0   (0.0-7.0)  %


 


Baso % (Auto)  0.5   (0.0-1.5)  %


 


Neut # (Auto)  3.3   (1.4-5.7)  K/uL


 


Lymph # (Auto)  2.4   (0.6-2.4)  K/uL


 


Mono # (Auto)  0.6   (0.0-0.8)  K/uL


 


Eos # (Auto)  0.3   (0.0-0.7)  K/uL


 


Baso # (Auto)  0.0   (0.0-0.1)  K/uL


 


Nucleated RBC %  0.0   /100WBC


 


Nucleated RBCs #  0   K/uL


 


Sodium   136  (136-148)  mmol/L


 


Potassium   3.7  (3.5-5.1)  mmol/L


 


Chloride   100  ()  mmol/L


 


Carbon Dioxide   20.6 L  (21.0-32.0)  mmol/L


 


BUN   13  (7.0-18.0)  mg/dL


 


Creatinine   1.6 H  (0.8-1.3)  mg/dL


 


Est Cr Clr Drug Dosing   66.54  mL/min


 


Estimated GFR (MDRD)   59.9  ml/min


 


Glucose   136 H  ()  mg/dL


 


Calcium   8.6  (8.5-10.1)  mg/dL


 


Total Bilirubin   0.8  (0.2-1.0)  mg/dL


 


AST   18  (15-37)  IU/L


 


ALT   21  (14-63)  IU/L


 


Alkaline Phosphatase   76  ()  U/L


 


Total Protein   7.6  (6.4-8.2)  g/dL


 


Albumin   3.6  (3.4-5.0)  g/dL


 


Globulin   4.0  (2.6-4.0)  g/dL


 


Albumin/Globulin Ratio   0.9  (0.9-1.6)  


 


Ethyl Alcohol   < 3.0  mg/dL











Meds: 


Medications














Discontinued Medications














Generic Name Dose Route Start Last Admin





  Trade Name Freq  PRN Reason Stop Dose Admin


 


Levetiracetam 1,000 mg/  110 mls @ 440 mls/hr  11/20/21 06:58  11/20/21 07:32





  Dextrose/Water  IV  11/20/21 07:12  440 mls/hr





  Q12H STA   Administration


 


Sodium Chloride  1,000 mls @ 999 mls/hr  11/20/21 06:57  11/20/21 07:12





  Normal Saline  IV  11/20/21 07:57  999 mls/hr





  .Bolus ONE   Administration


 


Sodium Chloride  10 ml  11/20/21 06:57  11/20/21 07:12





  Sodium Chloride 0.9% 10 Ml Syringe  FLUSH   10 ml





  ASDIRECTED PRN   Administration





  Keep Vein Open  


 


Sodium Chloride  2.5 ml  11/20/21 06:57  11/20/21 07:12





  Sodium Chloride 0.9% 2.5 Ml Syringe  FLUSH   2.5 ml





  ASDIRECTED PRN   Administration





  Keep Vein Open  














Departure





- Departure


Time of Disposition: 08:53


Condition: Fair





- Discharge Information


*PRESCRIPTION DRUG MONITORING PROGRAM REVIEWED*: No


*COPY OF PRESCRIPTION DRUG MONITORING REPORT IN PATIENT ADOLFO: No





Sepsis Event Note (ED)





- Focused Exam


Vital Signs: 


                                   Vital Signs











  Temp Pulse Resp BP Pulse Ox


 


 11/20/21 09:10   68  16  116/81  98


 


 11/20/21 08:50   75  16  112/65  100


 


 11/20/21 07:56   58 L  16  111/64  100


 


 11/20/21 07:13   79  16  125/78  100


 


 11/20/21 06:40  36.3 C  94  18  129/78  98

## 2021-11-20 NOTE — PCM.EKG
** #1 Interpretation


EKG Date: 11/20/21


Time: 06:37


EKG Interpretation Comments: 


EKG:


As interpreted by ER physician: Jaimie:


Nonspecific ST-T wave abnormalities


Normal axis


No evidence of ST elevation MI


Normal sinus rhythm heart rate of 92

## 2021-12-10 ENCOUNTER — TELEPHONE (OUTPATIENT)
Dept: NEUROLOGY | Facility: CLINIC | Age: 36
End: 2021-12-10

## 2021-12-10 NOTE — TELEPHONE ENCOUNTER
Sister called to help get patient set up for some appointment for a 2nd opinion on his seizures. Patient lives in [place] and Sister asked to be contacted for scheduling

## 2021-12-16 NOTE — TELEPHONE ENCOUNTER
Spoke with patient's sister.  Discussed appointments and information needed for scheduling.  Kerry will speak with Giovanna and call back to schedule.

## 2022-01-05 NOTE — TELEPHONE ENCOUNTER
Spoke with patient's sister.  Declined scheduling and admission at this time.  Did schedule 3 hr video EEG and new patient office visit for second opinion.

## 2022-03-03 ENCOUNTER — OFFICE VISIT (OUTPATIENT)
Dept: NEUROLOGY | Facility: CLINIC | Age: 37
End: 2022-03-03
Payer: COMMERCIAL

## 2022-03-03 ENCOUNTER — ANCILLARY PROCEDURE (OUTPATIENT)
Dept: NEUROLOGY | Facility: CLINIC | Age: 37
End: 2022-03-03
Payer: COMMERCIAL

## 2022-03-03 VITALS — TEMPERATURE: 97.1 F | BODY MASS INDEX: 23.81 KG/M2 | HEIGHT: 72 IN | WEIGHT: 175.8 LBS

## 2022-03-03 DIAGNOSIS — G40.109 FOCAL EPILEPSY (H): Primary | ICD-10-CM

## 2022-03-03 DIAGNOSIS — R56.9 SEIZURE (H): ICD-10-CM

## 2022-03-03 NOTE — PROGRESS NOTES
[facility] Epilepsy Care History and Physical       Patient:  Giovanna Acosta  :  1986   Age:  35 year old   Today's Office Visit:  3/3/2022    Referring Provider:    Referred Self, MD  No address on file    History of Present Illness:    Giovanna Acosta (Yi) is a 36 yo right-handed male with history of epilepsy.  He is alone in this visit and he is a poor historian.  Apparently his first seizure happened at work 2020. He was working on [employer]. He felt a pressure in the back of his head and lost consciousness. His co-worker said he was shaking for about 5-6 min. He bit his tongue. He denies auras.  He had another seizure in 2021. He was going to the bathroom and he fell. His wife said she was shaking for about 2 min. He bit his tongue. He had another seizure in 2021 in sleep. His wife witnessed he was shaking.  He doesn't have any records in the chart. He is not aware of any other seizures.   He did not know the name of his medication. I asked him to call his pharmacy and ask the name and dose of his medication. He forgot to ask the dosage.  He is taking levetiracetam, 1 tablet twice a day, don't know the pill size.   Epilepsy Risk Factors: no prenatal or  complications, no febrile seizures, meningitis, severe head injuries, family history of epilepsy.   Precipitating factors:  unknown  Past Medical/surgical History: s/p kidney [id number] doesn't know the reason.   Social History     Socioeconomic History     Marital status:      Spouse name: Not on file     Number of children: Not on file     Years of education: Not on file     Highest education level: Not on file   Occupational History     Not on file   Tobacco Use     Smoking status: Not on file     Smokeless tobacco: Not on file   Substance and Sexual Activity     Alcohol use: Not on file     Drug use: Not on file     Sexual activity: Not on file   Other Topics Concern     Not on file   Social  "History Narrative     Not on file     Social Determinants of Health     Financial Resource Strain: Not on file   Food Insecurity: Not on file   Transportation Needs: Not on file   Physical Activity: Not on file   Stress: Not on file   Social Connections: Not on file   Intimate Partner Violence: Not on file   Housing Stability: Not on file      Employment/School: originally from [place]. He works as a . He did schooling in [place], he did well at school. He graduated high school back home. He moved to [place] in [place]. He is , has 3 children.   Driving:  Currently patient is: not driving  Previous Evaluations for Epilepsy:  No records on the chart    No current outpatient medications on file.     Perceived AED Side Effects:No   Past AEDs:  No flowsheet data found.    Exam:    Temp 97.1  F (36.2  C) (Temporal)   Ht 5' 11.5\" (181.6 cm)   Wt 175 lb 12.8 oz (79.7 kg)   BMI 24.18 kg/m      ENERAL APPEARANCE:  Alert, awake, in no apparent distress.    NEUROLOGICAL EXAMINATION:   MENTAL STATUS:  Alert and oriented x4.    LANGUAGE/SPEECH:  No aphasia or dysarthria.   CRANIAL NERVES:  Extraocular movements are intact. Face is symmetric.  Hearing is intact to voice.   MOTOR:  Normal tone, bulk and strength 5/5 with no pronator drift.   SENSATION:  Intact to light touch  COORDINATION:  Normal finger to nose.  No dysmetria or tremor.   REFLEXES:  DTRs 1+ symmetric.   GAIT:  Gait and tandem gait are steady.     Assessment and Plan:    Generalized tonic clonic seizures, likely focal to bilateral TC seizures.  He has had 3 convulsions which he is aware of, one out of sleep, between 1/2020 and 9/2021. He is not aware of any triggers, denies auras, no known epilepsy risk factors. His EEG today showed rare bifrontal and occasional left frontocentrotemporal sharp waves.  I discussed doing a brain MRI.   I also asked him to call the clinic and let me know the dosage of his Keppra.     - Obtain Keppra level today.  - 3 " Samira brain MRI with epilepsy protocol.  - Follow up phone call after MRI        As described above, I met with the patient for 50 minutes and during this time counseling and coordination of care was greater than 50% of the visit time. I spent an additional 15 min on documentation. This note was created in part by the use of Dragon voice recognition system. Inadvertent grammatical errors and typographical errors may still exist.  Maria Guadalupe Shell MD

## 2022-03-03 NOTE — LETTER
3/3/2022       RE: Giovanna Acosta  : 1985   MRN: [id number]      Dear Colleague,    Thank you for referring your patient, Giovanna Acosta, to the [facility] EPILEPSY CARE at [facility]. Please see a copy of my visit note below.    [facility] Epilepsy Care History and Physical       Patient:  Giovanna Acosta  :  1986   Age:  35 year old   Today's Office Visit:  3/3/2022    Referring Provider:    Referred Self, MD  No address on file    History of Present Illness:    Giovanna Acosta (Yi) is a 34 yo right-handed male with history of epilepsy.  He is alone in this visit and he is a poor historian.  Apparently his first seizure happened at work 2020. He was working on [employer]. He felt a pressure in the back of his head and lost consciousness. His co-worker said he was shaking for about 5-6 min. He bit his tongue. He denies auras.  He had another seizure in 2021. He was going to the bathroom and he fell. His wife said she was shaking for about 2 min. He bit his tongue. He had another seizure in 2021 in sleep. His wife witnessed he was shaking.  He doesn't have any records in the chart. He is not aware of any other seizures.   He did not know the name of his medication. I asked him to call his pharmacy and ask the name and dose of his medication. He forgot to ask the dosage.  He is taking levetiracetam, 1 tablet twice a day, don't know the pill size.   Epilepsy Risk Factors: no prenatal or  complications, no febrile seizures, meningitis, severe head injuries, family history of epilepsy.   Precipitating factors:  unknown  Past Medical/surgical History: s/p kidney [id number] doesn't know the reason.   Social History     Socioeconomic History     Marital status:      Spouse name: Not on file     Number of children: Not on file     Years of education: Not on file     Highest education level: Not on file  "  Occupational History     Not on file   Tobacco Use     Smoking status: Not on file     Smokeless tobacco: Not on file   Substance and Sexual Activity     Alcohol use: Not on file     Drug use: Not on file     Sexual activity: Not on file   Other Topics Concern     Not on file   Social History Narrative     Not on file     Social Determinants of Health     Financial Resource Strain: Not on file   Food Insecurity: Not on file   Transportation Needs: Not on file   Physical Activity: Not on file   Stress: Not on file   Social Connections: Not on file   Intimate Partner Violence: Not on file   Housing Stability: Not on file      Employment/School: originally from [employer] He works as a . He did schooling in [place], he did well at school. He graduated high school back home. He moved to [place] in 2007. He is , has 3 children.   Driving:  Currently patient is: not driving  Previous Evaluations for Epilepsy:  No records on the chart    No current outpatient medications on file.     Perceived AED Side Effects:No   Past AEDs:  No flowsheet data found.    Exam:    Temp 97.1  F (36.2  C) (Temporal)   Ht 5' 11.5\" (181.6 cm)   Wt 175 lb 12.8 oz (79.7 kg)   BMI 24.18 kg/m      ENERAL APPEARANCE:  Alert, awake, in no apparent distress.    NEUROLOGICAL EXAMINATION:   MENTAL STATUS:  Alert and oriented x4.    LANGUAGE/SPEECH:  No aphasia or dysarthria.   CRANIAL NERVES:  Extraocular movements are intact. Face is symmetric.  Hearing is intact to voice.   MOTOR:  Normal tone, bulk and strength 5/5 with no pronator drift.   SENSATION:  Intact to light touch  COORDINATION:  Normal finger to nose.  No dysmetria or tremor.   REFLEXES:  DTRs 1+ symmetric.   GAIT:  Gait and tandem gait are steady.     Assessment and Plan:    Generalized tonic clonic seizures, likely focal to bilateral TC seizures.  He has had 3 convulsions which he is aware of, one out of sleep, between 1/2020 and 9/2021. He is not aware of any triggers, " denies auras, no known epilepsy risk factors. His EEG today showed rare bifrontal sharp waves, predominantly right.  I discussed doing a brain MRI.   I also asked him to call the clinic and let me know the dosage of his Keppra.     - Obtain Keppra level today.  - 3 Samira brain MRI with epilepsy protocol.  - Follow up phone call after MRI        As described above, I met with the patient for 50 minutes and during this time counseling and coordination of care was greater than 50% of the visit time. I spent an additional 15 min on documentation. This note was created in part by the use of Dragon voice recognition system. Inadvertent grammatical errors and typographical errors may still exist.  Maria Guadalupe Shell MD            Again, thank you for allowing me to participate in the care of your patient.      Sincerely,    Maria Guadalupe Shell MD

## 2022-03-04 LAB — LEVETIRACETAM (KEPPRA): <2 UG/ML (ref 6–46)

## 2022-03-09 DIAGNOSIS — G40.109 FOCAL EPILEPSY (H): Primary | ICD-10-CM

## 2022-03-09 RX ORDER — LEVETIRACETAM 750 MG/1
TABLET ORAL
Qty: 270 TABLET | Refills: 3 | Status: SHIPPED | OUTPATIENT
Start: 2022-03-09

## 2022-03-09 NOTE — PROGRESS NOTES
I called Giovanna and discussed levetiracetam level. His levetiracetam level was <2. He said he took his medication before coming to the clinic. He asked the pharmacy and confirmed that his levetiracetam is 750 mg. I advised him to increase his levetiracetam to 750 mg am and 1500 mg pm. The patient voiced understanding.     Maria Guadalupe Shell MD

## 2022-03-29 ENCOUNTER — VIRTUAL VISIT (OUTPATIENT)
Dept: NEUROLOGY | Facility: CLINIC | Age: 37
End: 2022-03-29
Payer: COMMERCIAL

## 2022-03-29 DIAGNOSIS — E55.9 VITAMIN D DEFICIENCY: ICD-10-CM

## 2022-03-29 DIAGNOSIS — G40.109 FOCAL EPILEPSY (H): Primary | ICD-10-CM

## 2022-03-29 NOTE — PROGRESS NOTES
[facility] Epilepsy Care Progress Note      Patient:  Giovanna ESPINOSA Ndandou  :  1985   Age:  36 year old   Today's Virtual Visit:  3/29/2022    Background History:   His first seizure happened at work 2020. He was working on [employer]. He felt a pressure in the back of his head and lost consciousness. His co-worker said he was shaking for about 5-6 min. He bit his tongue. He denies aura.  He had another seizure in 2021. He was going to the bathroom and fell. His wife said she was shaking for about 2 min. He bit his tongue. He had another seizure in 2021 in sleep. His wife witnessed he was shaking.    History of Present Illness:    Giovanna was seen 3/3/2022.  He increased his Keppra to 750-1500. He is a little tired sometimes. Denies dizziness, mood changes or irritability. He did not have any seizures since last visit.       Current Outpatient Medications   Medication Sig Dispense Refill     levETIRAcetam (KEPPRA) 750 MG tablet Take 1 tablet in the morning and 2 tabs in the evening 270 tablet 3        Medication Notes:        AED Medication Compliance:  compliant most of the time    Other Issues:    Is patient safe to drive:  Yes    Assessment and Plan:    Focal epilepsy: Patient has had 3 seizures in life, likely focal to bilateral tonic-clonic seizures, 1 happened in sleep.  Last one was 2021.  His levetiracetam level was subtherapeutic on 750 mg twice a day.  I advised him to increase it to 750 mg in the morning and 1500 mg in the evening on .  He has not had any further seizures.  He has occasional tiredness but no other side effects.      -Continue levetiracetam 750-1500  -MRI is scheduled next Wednesday  -Obtain levetiracetam level  -Follow-up in 2 months      As described above, I talked with the patient for 10 minutes and during this time counseling was greater than 50% of the visit time.This note was created in part by the use of Dragon voice recognition  system. Inadvertent grammatical errors and typographical errors may still exist.  Maria Guadalupe Shell MD

## 2022-03-29 NOTE — LETTER
3/29/2022       RE: Giovanna Acosta  : 1985   MRN: [id number]      Dear Colleague,    Thank you for referring your patient, Giovanna Acosta, to the [facility] EPILEPSY CARE at [facility]. Please see a copy of my visit note below.    [facility] Epilepsy Care Progress Note      Patient:  Giovanna Acosta  :  1985   Age:  36 year old   Today's Virtual Visit:  3/29/2022    Background History:   His first seizure happened at work 2020. He was working on [employer]. He felt a pressure in the back of his head and lost consciousness. His co-worker said he was shaking for about 5-6 min. He bit his tongue. He denies aura.  He had another seizure in 2021. He was going to the bathroom and fell. His wife said she was shaking for about 2 min. He bit his tongue. He had another seizure in 2021 in sleep. His wife witnessed he was shaking.    History of Present Illness:    Giovanna was seen 3/3/2022.  He increased his Keppra to 750-1500. He is a little tired sometimes. Denies dizziness, mood changes or irritability. He did not have any seizures since last visit.       Current Outpatient Medications   Medication Sig Dispense Refill     levETIRAcetam (KEPPRA) 750 MG tablet Take 1 tablet in the morning and 2 tabs in the evening 270 tablet 3        Medication Notes:        AED Medication Compliance:  compliant most of the time    Other Issues:    Is patient safe to drive:  Yes    Assessment and Plan:    Focal epilepsy: Patient has had 3 seizures in life, likely focal to bilateral tonic-clonic seizures, 1 happened in sleep.  Last one was 2021.  His levetiracetam level was subtherapeutic on 750 mg twice a day.  I advised him to increase it to 750 mg in the morning and 1500 mg in the evening on .  He has not had any further seizures.  He has occasional tiredness but no other side effects.      -Continue levetiracetam 750-1500  -MRI is  scheduled next Wednesday  -Obtain levetiracetam level  -Follow-up in 2 months      As described above, I talked with the patient for 10 minutes and during this time counseling was greater than 50% of the visit time.This note was created in part by the use of Dragon voice recognition system. Inadvertent grammatical errors and typographical errors may still exist.  Maria Guadalupe Shell MD                      Giovanna is a 36 year old who is being evaluated via a billable telephone visit.      What phone number would you like to be contacted at? [phone]  How would you like to obtain your AVS? MyChart        Again, thank you for allowing me to participate in the care of your patient.      Sincerely,    Maria Guadalupe Shell MD

## 2022-03-29 NOTE — PROGRESS NOTES
Giovanna is a 36 year old who is being evaluated via a billable telephone visit.      What phone number would you like to be contacted at? [phone]  How would you like to obtain your AVS? [place]

## 2022-04-06 ENCOUNTER — ANCILLARY PROCEDURE (OUTPATIENT)
Dept: MRI IMAGING | Facility: CLINIC | Age: 37
End: 2022-04-06
Attending: PSYCHIATRY & NEUROLOGY
Payer: COMMERCIAL

## 2022-04-06 DIAGNOSIS — G40.109 FOCAL EPILEPSY (H): ICD-10-CM

## 2022-04-06 PROCEDURE — 70553 MRI BRAIN STEM W/O & W/DYE: CPT | Mod: GC | Performed by: STUDENT IN AN ORGANIZED HEALTH CARE EDUCATION/TRAINING PROGRAM

## 2022-04-06 PROCEDURE — A9585 GADOBUTROL INJECTION: HCPCS | Performed by: STUDENT IN AN ORGANIZED HEALTH CARE EDUCATION/TRAINING PROGRAM

## 2022-04-06 RX ORDER — GADOBUTROL 604.72 MG/ML
10 INJECTION INTRAVENOUS ONCE
Status: COMPLETED | OUTPATIENT
Start: 2022-04-06 | End: 2022-04-06

## 2022-04-06 RX ADMIN — GADOBUTROL 8 ML: 604.72 INJECTION INTRAVENOUS at 18:23

## 2022-04-06 NOTE — DISCHARGE INSTRUCTIONS
MRI Contrast Discharge Instructions    The IV contrast you received today will pass out of your body in your  urine. This will happen in the next 24 hours. You will not feel this process.  Your urine will not change color.    Drink at least 4 extra glasses of water or juice today (unless your doctor  has restricted your fluids). This reduces the stress on your kidneys.  You may take your regular medicines.    If you are on dialysis: It is best to have dialysis today.    If you have a reaction: Most reactions happen right away. If you have  any new symptoms after leaving the hospital (such as hives or swelling),  call your hospital at the correct number below. Or call your family doctor.  If you have breathing distress or wheezing, call 911.    Special instructions: ***    I have read and understand the above information.    Signature:______________________________________ Date:___________    Staff:__________________________________________ Date:___________     Time:__________    [facility] Departments:    ___[facility]: [phone]  ___[facility]: [phone]  ___[place]: [phone] ___[facility]: [phone]  ___[facility]: [phone]  ___[facility]: [phone]  ___[phone]  ___[facility]: [phone]  ___[phone]

## 2022-04-12 ENCOUNTER — TELEPHONE (OUTPATIENT)
Dept: NEUROLOGY | Facility: CLINIC | Age: 37
End: 2022-04-12
Payer: COMMERCIAL

## 2022-04-12 NOTE — CONFIDENTIAL NOTE
I discussed the brain MRI results with Giovanna.  He denies headaches, imbalance, vision changes, or incontinence.  I asked him to obtain his old images from his previous providers if he has any.  I advised him to inform me if he experienced any of the above symptoms.     Maria Guadalupe Shell MD

## 2022-04-20 DIAGNOSIS — G91.1 ACQUIRED CEREBRAL VENTRICULOMEGALY (H): Primary | ICD-10-CM

## 2022-04-21 NOTE — TELEPHONE ENCOUNTER
RECORDS RECEIVED FROM: Internal   REASON FOR VISIT: Acquired cerebral ventriculomegaly   Date of Appt: 5/24/22   NOTES (FOR ALL VISITS) STATUS DETAILS   OFFICE NOTE from referring provider Internal Dr Shell @ [facility]:  3/29/22  3/3/22   MEDICATION LIST Internal    IMAGING  (FOR ALL VISITS)     MRI (HEAD, NECK, SPINE) Internal [facility]:  MRI Brain 4/6/22

## 2022-05-24 ENCOUNTER — PRE VISIT (OUTPATIENT)
Dept: NEUROSURGERY | Facility: CLINIC | Age: 37
End: 2022-05-24
Payer: COMMERCIAL

## 2022-05-24 ENCOUNTER — OFFICE VISIT (OUTPATIENT)
Dept: NEUROSURGERY | Facility: CLINIC | Age: 37
End: 2022-05-24
Attending: PSYCHIATRY & NEUROLOGY
Payer: COMMERCIAL

## 2022-05-24 VITALS
SYSTOLIC BLOOD PRESSURE: 114 MMHG | WEIGHT: 170 LBS | HEIGHT: 72 IN | BODY MASS INDEX: 23.03 KG/M2 | OXYGEN SATURATION: 100 % | DIASTOLIC BLOOD PRESSURE: 80 MMHG | HEART RATE: 87 BPM | RESPIRATION RATE: 16 BRPM

## 2022-05-24 DIAGNOSIS — G91.1 ACQUIRED CEREBRAL VENTRICULOMEGALY (H): ICD-10-CM

## 2022-05-24 PROCEDURE — 99204 OFFICE O/P NEW MOD 45 MIN: CPT | Performed by: NEUROLOGICAL SURGERY

## 2022-05-24 ASSESSMENT — PAIN SCALES - GENERAL: PAINLEVEL: NO PAIN (0)

## 2022-05-24 NOTE — PATIENT INSTRUCTIONS
Thank you for choosing [facility]. You were seen in the Neurosurgery Clinic today with Dr. Marti.    Next steps:  Call us with any questions regarding surgery.    Please don't hesitate to reach out via MyChart or telephone if you have any questions after your visit.    Purnima Olivier RN Care Coordinator, Neurosurgery    Direct: [phone]  Neurosurgery Clinic: [phone]

## 2022-05-24 NOTE — PROGRESS NOTES
Service Date: 2022    Maria Guadalupe Shell MD  [facility] Epilepsy [facility]  [address], [address]  [place] [address]    RE:      Giovanna Acosta  MRN:  [id number]  :   1985    Dear Maria Guadalupe:    I had the opportunity to see Mr. Acosta today.  As you know, he is a 36-year-old [place]an-speaking man, who has a history of seizures for a number of years.  He is under your care in terms of his seizure management.  On MRI, it was noted that he has hydrocephalus with dilatation of the third and lateral ventricle and a small fourth.  On my viewing, the pictures indicate that he may have either aqueductal stenosis or potentially a small tectal lesion such as a glioma that could be obstructing the flow of CSF.  For that reason, his third ventricle was quite enlarged.     I spent about an hour discussing with him the images, as well as talking to him about the condition of hydrocephalus.  It was somewhat difficult as he speaks [place]an; however, his comprehension of English and my Chilean is very good.  I discussed with him the possibility of a ventriculoperitoneal shunt.  I also discussed with my colleague, Dr. Leroy, whether he may be a candidate for a third ventriculostomy; however, because his third ventricle was quite ballooned out and is plastered against the optic nerve, the pituitary gland and the clivus with upward protruding basilar that is in the way, the safer option would be to place a ventriculoperitoneal shunt.  We gave him our information, and he wants to think about this.  He will discuss it with his family.  I told him that medication is not likely to decrease the CSF enough to give him any benefit.  I also told him that it is uncertain whether this would impact on his seizures or not.    I thank you very much for allowing me to see this very pleasant patient.    Sincerely,          Dao Marti MD        D: 2022   T: 2022   MT: al    Name:     GIOVANNA ACOSTA  MRN:       [id number]        Account:      [id number]   :      1985           Service Date: 2022       Document: [id number]

## 2022-05-24 NOTE — LETTER
2022       RE: Giovanna Acosta  [address]  [place] [place] [facility] [address]     Dear Colleague,    Thank you for referring your patient, Giovanna Acosta, to the [facility] NEUROSURGERY CLINIC [place] [facility]. Please see a copy of my visit note below.    Service Date: 2022    Maria Guadalupe Shell MD  [facility]ilepsy [facility]  [address], [address]  [place] [address]    RE:      Giovanna Acosta  MRN:  [id number]  :   1985    Dear Maria Guadalupe:    I had the opportunity to see Mr. Acosta today.  As you know, he is a 36-year-old [place]-speaking man, who has a history of seizures for a number of years.  He is under your care in terms of his seizure management.  On MRI, it was noted that he has hydrocephalus with dilatation of the third and lateral ventricle and a small fourth.  On my viewing, the pictures indicate that he may have either aqueductal stenosis or potentially a small tectal lesion such as a glioma that could be obstructing the flow of CSF.  For that reason, his third ventricle was quite enlarged.     I spent about an hour discussing with him the images, as well as talking to him about the condition of hydrocephalus.  It was somewhat difficult as he speaks [place]; however, his comprehension of English and my [place] is very good.  I discussed with him the possibility of a ventriculoperitoneal shunt.  I also discussed with my colleague, Dr. Leroy, whether he may be a candidate for a third ventriculostomy; however, because his third ventricle was quite ballooned out and is plastered against the optic nerve, the pituitary gland and the clivus with upward protruding basilar that is in the way, the safer option would be to place a ventriculoperitoneal shunt.  We gave him our information, and he wants to think about this.  He will discuss it with his family.  I told him that medication is not likely to decrease the CSF enough  to give him any benefit.  I also told him that it is uncertain whether this would impact on his seizures or not.    I thank you very much for allowing me to see this very pleasant patient.    Sincerely,    Dao Marti MD        D: 2022   T: 2022   MT: al    Name:     JUAN REYESAIDEE CROOK  MRN:      [id number]        Account:      [id number]   :      1985           Service Date: 2022       Document: [id number]

## 2022-05-24 NOTE — NURSING NOTE
Chief Complaint   Patient presents with     Consult     Cerebral ventriculomegaly       Pasquale Mercado, EMT